# Patient Record
Sex: FEMALE | URBAN - METROPOLITAN AREA
[De-identification: names, ages, dates, MRNs, and addresses within clinical notes are randomized per-mention and may not be internally consistent; named-entity substitution may affect disease eponyms.]

---

## 2023-03-07 ENCOUNTER — OFFICE VISIT (OUTPATIENT)
Dept: URBAN - METROPOLITAN AREA CLINIC 13 | Facility: CLINIC | Age: 68
End: 2023-03-07
Payer: MEDICARE

## 2023-03-07 DIAGNOSIS — H43.813 VITREOUS DEGENERATION, BILATERAL: ICD-10-CM

## 2023-03-07 DIAGNOSIS — H35.371 PUCKERING OF MACULA, RIGHT EYE: ICD-10-CM

## 2023-03-07 DIAGNOSIS — H33.312 HORSESHOE TEAR OF RETINA WITHOUT DETACHMENT, LEFT EYE: Primary | ICD-10-CM

## 2023-03-07 PROCEDURE — 67145 PROPH RTA DTCHMNT PC: CPT | Performed by: OPHTHALMOLOGY

## 2023-03-07 PROCEDURE — 99205 OFFICE O/P NEW HI 60 MIN: CPT | Performed by: OPHTHALMOLOGY

## 2023-03-07 PROCEDURE — 92134 CPTRZ OPH DX IMG PST SGM RTA: CPT | Performed by: OPHTHALMOLOGY

## 2023-03-07 ASSESSMENT — INTRAOCULAR PRESSURE
OS: 19
OD: 19

## 2023-03-07 NOTE — IMPRESSION/PLAN
Impression: Puckering of macula, right eye: H35.371. Plan: Exam and OCT demonstrate a Macular Pucker. The diagnosis, natural history, and prognosis of ERMs, as well as the risks and benefits of PPV/MP versus observation were discussed at length. Given the patient's current visual acuity and minimal hindrance on activities of daily living, observation was recommended at this time.

## 2023-03-07 NOTE — IMPRESSION/PLAN
Impression: Horseshoe tear of retina without detachment, left eye: H33.312. Plan: SD Exam and optos color demonstrate that the patient has a retinal tear. We discussed that an untreated retinal tear could progress into a retinal detachment causing significant and irreversible visual loss. The R/B/A of the various treatment options including laser and cryo treatment were discussed with the patient. We recommend urgent same day treatment to reduce the risk of a retinal detachment. Risks of treatment include but are not limited to reduced peripheral vision, pain, swelling, recurrent hemorrhage, progressive retinal tear or retinal detachment, new retinal tear and need for additional laser, cryo, or surgery. After discussing the risks, benefits, indications, and alternatives, the patient elects to proceed with laser treatment to the retinal tear. Laser retinopexy was performed without complication today in clinic RTC 6 mo with OCT OU, Optos color

## 2023-03-07 NOTE — IMPRESSION/PLAN
Impression: Vitreous degeneration, bilateral: H43.813. Plan: OD: No RT or RD. Floaters are mild.  
OS: See above